# Patient Record
Sex: FEMALE | Race: WHITE | Employment: FULL TIME
[De-identification: names, ages, dates, MRNs, and addresses within clinical notes are randomized per-mention and may not be internally consistent; named-entity substitution may affect disease eponyms.]

---

## 2020-09-09 ENCOUNTER — TRANSCRIBE ORDERS (OUTPATIENT)
Dept: OTHER | Age: 40
End: 2020-09-09

## 2020-09-09 DIAGNOSIS — Z80.3 FAMILY HISTORY OF MALIGNANT NEOPLASM OF BREAST: Primary | ICD-10-CM

## 2020-09-09 DIAGNOSIS — Z80.41 FAMILY HISTORY OF MALIGNANT NEOPLASM OF OVARY IN FIRST DEGREE RELATIVE: ICD-10-CM

## 2020-09-16 NOTE — TELEPHONE ENCOUNTER
ONCOLOGY INTAKE: Records Information      APPT INFORMATION: 10/12/20 - Krysta - Video  Referring provider:  Laurie Mcintosh NP  Referring provider s clinic:  [facility]  Reason for visit/diagnosis:  fx hx breast & ovarian cancers  Has patient been notified of appointment date and time?: Yes    RECORDS INFORMATION:  Were the records received with the referral (via [employer])? No    Has patient been seen for any external appt for this diagnosis? No    ADDITIONAL INFORMATION:  Scheduled via call from li/LUCHO GAYLE  Video visit confirmed for 10/12/20 at 12pm with Kelli Chaparro

## 2020-10-12 ENCOUNTER — PRE VISIT (OUTPATIENT)
Dept: ONCOLOGY | Facility: CLINIC | Age: 40
End: 2020-10-12

## 2020-10-12 ENCOUNTER — VIRTUAL VISIT (OUTPATIENT)
Dept: ONCOLOGY | Facility: CLINIC | Age: 40
End: 2020-10-12
Attending: GENETIC COUNSELOR, MS

## 2020-10-12 DIAGNOSIS — Z80.3 FAMILY HISTORY OF MALIGNANT NEOPLASM OF BREAST: ICD-10-CM

## 2020-10-12 DIAGNOSIS — Z80.41 FAMILY HISTORY OF MALIGNANT NEOPLASM OF OVARY: Primary | ICD-10-CM

## 2020-10-12 PROCEDURE — 99207 PR NO CHARGE LOS: CPT | Performed by: GENETIC COUNSELOR, MS

## 2020-10-12 PROCEDURE — 96040 HC GENETIC COUNSELING, EACH 30 MINUTES: CPT | Mod: GT | Performed by: GENETIC COUNSELOR, MS

## 2020-10-12 NOTE — PROGRESS NOTES
"10/12/2020    Angie Muñoz is a 40 year old female who is being evaluated via a billable video visit.      The patient has been notified of following:     \"This video visit will be conducted via a call between you and your physician/provider. We have found that certain health care needs can be provided without the need for an in-person physical exam.  This service lets us provide the care you need with a video conversation.  If a prescription is necessary we can send it directly to your pharmacy.  If lab work is needed we can place an order for that and you can then stop by our lab to have the test done at a later time.    Video visits are billed at different rates depending on your insurance coverage.  Please reach out to your insurance provider with any questions.    If during the course of the call the physician/provider feels a video visit is not appropriate, you will not be charged for this service.\"    Patient has given verbal consent for Video visit? Yes  How would you like to obtain your AVS? MyChart      Video-Visit Details    Type of service:  Video Visit    Video Start Time: 11:55AM  Video End Time: 12:15PM    Originating Location (pt. Location): Home    Distant Location (provider location):  [facility]     Platform used for Video Visit: Osei Chaparro MS, [facility]    Referring Provider: Laurie Mcintosh NP ([facility])    Presenting Information:   I met with Angie Muñoz today for genetic counseling at the Cancer Risk Management Program (virtual visit completed due to the ongoing COVID-19 pandemic) to discuss her family history of ovarian and breast cancer.  She is here today to review this history, cancer screening recommendations, and available genetic testing options.    Personal History:  Angie is a 40 year old female. She does not have any personal history of cancer.  She had her first menstrual period at age 12-13 and has two children (ages 7 and 11).  She has " her ovaries, fallopian tubes and uterus in place, and has recently had baseline CA-125 and transvaginal ultrasound screening for ovarian cancer.  She reports a mammogram approximately one year ago which was normal.     Family History: (Please see scanned pedigree for detailed family history information)    Her mother was recently diagnosed with ovarian cancer at age 60.  She is meeting with a genetic counselor in [place] tomorrow for genetic testing.      Two maternal aunts were diagnosed with breast cancer, reportedly after the age of 50    Her maternal grandfather passed away in his 70's and had a history of prostate cancer    Limited paternal family history information was available.  Angie reports her paternal grandmother had a history of lung cancer and smoking    Her maternal ethnicity is Latvian. Her paternal ethnicity is Latvian/Ugandan.  There is no known Ashkenazi Muslim ancestry on either side of her family.     Discussion:    Angie's maternal family history of ovarian and breast cancer is suggestive of a possible hereditary cancer syndrome.    We reviewed the features of sporadic, familial, and hereditary cancers. Based on her family history, Angie meets current National Comprehensive Cancer Network (NCCN) criteria for genetic testing of high-penetrance breast and/or ovarian cancer susceptibility genes.      We discussed the natural history and genetics of Hereditary Breast and Ovarian Cancer syndrome, caused by mutations in the BRCA1/2 genes. We discussed that there are additional genes that could cause increased risk for ovarian and/or breast cancer. As many of these genes present with overlapping features in a family and accurate cancer risk cannot always be established based upon the pedigree analysis alone, it would be reasonable for Angie to consider panel genetic testing to analyze multiple genes at once.    A detailed handout regarding hereditary breast and gynecologic cancer, and the  information we discussed can be found in the after visit summary. Topics included: inheritance pattern, cancer risks, cancer screening recommendations, and also risks, benefits and limitations of testing.    We discussed that genetic testing for ovarian cancer susceptibility genes is typically most informative when it is first performed on a family member with a personal history of this cancer. In this situation, we discussed that Angie's mother would be the best person to test first given her maternal family history of ovarian and breast cancer. Testing is available to Angie, but with limitations. If Angie pursues testing at this time and receives a negative result, this does not rule out the possibility of a hereditary cancer syndrome in her and/or her family. Her mother's genetic test results will determine what testing, if any, is recommended for Angie and her siblings.  As her mother is planning to proceed with genetic testing tomorrow, Angie elected to wait until these results are available prior to completing her own test. She will contact me to revisit available genetic testing and medical management recommendations once her mother has completed genetic testing.       We reviewed screening recommendations for Angie based on her family history of cancer.  Of note, these recommendations may change based on the results of her mother's genetic testing:    Angie and her sister may be at increased risk for ovarian cancer given their mother's recent diagnosis. We discussed available ovarian cancer screening (pelvic exams, CA-125 blood tests, and transvaginal ultrasounds) as well as the significant limitations of this screening. As such, this screening is not typically recommended. That being said, women in this family should discuss this screening and the signs and symptoms of ovarian cancer with their primary OB/GYN provider, as they may have individualized recommendations.    Angie  and her close female relatives remain at increased risk for breast cancer given their family history. Breast cancer screening is generally recommended to begin approximately 10 years younger than the earliest age of breast cancer diagnosis in the family, or at age 40, whichever comes first. Breast screening options should be discussed with an individual's primary care provider and a genetic counselor, to determine at what age to begin screening, what screening is appropriate, and if additional screening (such as breast MRI) is necessary based on personal/family history factors.      Cancer screening recommendations for Angie and her close relatives should be revisited when genetic testing has been completed.      Plan:  1) No genetic testing was completed today  2) We reviewed screening recommendations based on her family history of cancer.  Her mother is meeting with a genetic counselor tomorrow and plans to proceed with testing due to her personal and family history of cancer  3) Angie plans to contact me once her mother's genetic testing is completed to revisit testing and management recommendations.      Time spent: 20 minutes    Kelli Chaparro MS, [facility]  Licensed, Certified Genetic Counselor  [facility]  Phone: [phone]

## 2020-10-12 NOTE — LETTER
"    10/12/2020         RE: Angie Muñoz  [address]  [place] [address]        Dear Colleague,    Thank you for referring your patient, Angie Muñoz, to the [facility]. Please see a copy of my visit note below.    10/12/2020    Angie Muñoz is a 40 year old female who is being evaluated via a billable video visit.      The patient has been notified of following:     \"This video visit will be conducted via a call between you and your physician/provider. We have found that certain health care needs can be provided without the need for an in-person physical exam.  This service lets us provide the care you need with a video conversation.  If a prescription is necessary we can send it directly to your pharmacy.  If lab work is needed we can place an order for that and you can then stop by our lab to have the test done at a later time.    Video visits are billed at different rates depending on your insurance coverage.  Please reach out to your insurance provider with any questions.    If during the course of the call the physician/provider feels a video visit is not appropriate, you will not be charged for this service.\"    Patient has given verbal consent for Video visit? Yes  How would you like to obtain your AVS? MyChart      Video-Visit Details    Type of service:  Video Visit    Video Start Time: 11:55AM  Video End Time: 12:15PM    Originating Location (pt. Location): Home    Distant Location (provider location):  [facility]     Platform used for Video Visit: Osei Chaparro MS, [facility]    Referring Provider: Laruie Mcintosh NP ([facility])    Presenting Information:   I met with Angie Muñoz today for genetic counseling at the Cancer Risk Management Program (virtual visit completed due to the ongoing COVID-19 pandemic) to discuss her family history of ovarian and breast cancer.  She is here today to review this history, cancer " screening recommendations, and available genetic testing options.    Personal History:  Angie is a 40 year old female. She does not have any personal history of cancer.  She had her first menstrual period at age 12-13 and has two children (ages 7 and 11).  She has her ovaries, fallopian tubes and uterus in place, and has recently had baseline CA-125 and transvaginal ultrasound screening for ovarian cancer.  She reports a mammogram approximately one year ago which was normal.     Family History: (Please see scanned pedigree for detailed family history information)    Her mother was recently diagnosed with ovarian cancer at age 60.  She is meeting with a genetic counselor in [place] tomorrow for genetic testing.      Two maternal aunts were diagnosed with breast cancer, reportedly after the age of 50    Her maternal grandfather passed away in his 70's and had a history of prostate cancer    Limited paternal family history information was available.  Angie reports her paternal grandmother had a history of lung cancer and smoking    Her maternal ethnicity is [place]. Her paternal ethnicity is [place].  There is no known Ashkenazi Orthodoxy ancestry on either side of her family.     Discussion:    Angie's maternal family history of ovarian and breast cancer is suggestive of a possible hereditary cancer syndrome.    We reviewed the features of sporadic, familial, and hereditary cancers. Based on her family history, Angie meets current National Comprehensive Cancer Network (NCCN) criteria for genetic testing of high-penetrance breast and/or ovarian cancer susceptibility genes.      We discussed the natural history and genetics of Hereditary Breast and Ovarian Cancer syndrome, caused by mutations in the BRCA1/2 genes. We discussed that there are additional genes that could cause increased risk for ovarian and/or breast cancer. As many of these genes present with overlapping features in a family and accurate  cancer risk cannot always be established based upon the pedigree analysis alone, it would be reasonable for Angie to consider panel genetic testing to analyze multiple genes at once.    A detailed handout regarding hereditary breast and gynecologic cancer, and the information we discussed can be found in the after visit summary. Topics included: inheritance pattern, cancer risks, cancer screening recommendations, and also risks, benefits and limitations of testing.    We discussed that genetic testing for ovarian cancer susceptibility genes is typically most informative when it is first performed on a family member with a personal history of this cancer. In this situation, we discussed that Angie's mother would be the best person to test first given her maternal family history of ovarian and breast cancer. Testing is available to Angie, but with limitations. If Angie pursues testing at this time and receives a negative result, this does not rule out the possibility of a hereditary cancer syndrome in her and/or her family. Her mother's genetic test results will determine what testing, if any, is recommended for Angie and her siblings.  As her mother is planning to proceed with genetic testing tomorrow, Angie elected to wait until these results are available prior to completing her own test. She will contact me to revisit available genetic testing and medical management recommendations once her mother has completed genetic testing.       We reviewed screening recommendations for Angie based on her family history of cancer.  Of note, these recommendations may change based on the results of her mother's genetic testing:    Angie and her sister may be at increased risk for ovarian cancer given their mother's recent diagnosis. We discussed available ovarian cancer screening (pelvic exams, CA-125 blood tests, and transvaginal ultrasounds) as well as the significant limitations of this  screening. As such, this screening is not typically recommended. That being said, women in this family should discuss this screening and the signs and symptoms of ovarian cancer with their primary OB/GYN provider, as they may have individualized recommendations.    Angie and her close female relatives remain at increased risk for breast cancer given their family history. Breast cancer screening is generally recommended to begin approximately 10 years younger than the earliest age of breast cancer diagnosis in the family, or at age 40, whichever comes first. Breast screening options should be discussed with an individual's primary care provider and a genetic counselor, to determine at what age to begin screening, what screening is appropriate, and if additional screening (such as breast MRI) is necessary based on personal/family history factors.      Cancer screening recommendations for Angie and her close relatives should be revisited when genetic testing has been completed.      Plan:  1) No genetic testing was completed today  2) We reviewed screening recommendations based on her family history of cancer.  Her mother is meeting with a genetic counselor tomorrow and plans to proceed with testing due to her personal and family history of cancer  3) Angie plans to contact me once her mother's genetic testing is completed to revisit testing and management recommendations.      Time spent: 20 minutes    Kelli Chaparro MS, [facility]  Licensed, Certified Genetic Counselor  [facility]  Phone: [phone]

## 2020-10-12 NOTE — LETTER
Cancer Risk Management  Program Locations    [facility]  [facility]  Mailing Address  Cancer Risk Management Program  [facility]  [address]  [facility] [address]  [place] [address]    New patient appointments  [phone]  October 14, 2020    Angie Muñoz  [address]  [place] [address]      Dear Angie,    It was a pleasure meeting with you on 10/12/2020. Here is a copy of the progress note from your recent genetic counseling visit to the Cancer Risk Management Program. If you have any additional questions, please feel free to call.    Referring Provider: Laurie Mcintosh NP ([facility])    Presenting Information:   I met with Angie Wanda today for genetic counseling at the Cancer Risk Management Program (virtual visit completed due to the ongoing COVID-19 pandemic) to discuss her family history of ovarian and breast cancer.  She is here today to review this history, cancer screening recommendations, and available genetic testing options.    Personal History:  Angie is a 40 year old female. She does not have any personal history of cancer.  She had her first menstrual period at age 12-13 and has two children (ages 7 and 11).  She has her ovaries, fallopian tubes and uterus in place, and has recently had baseline CA-125 and transvaginal ultrasound screening for ovarian cancer.  She reports a mammogram approximately one year ago which was normal.     Family History: (Please see scanned pedigree for detailed family history information)    Her mother was recently diagnosed with ovarian cancer at age 60.  She is meeting with a genetic counselor in [place] tomorrow for genetic testing.      Two maternal aunts were diagnosed with breast cancer, reportedly after the age of 50    Her maternal grandfather passed away in his  70's and had a history of prostate cancer    Limited paternal family history information was available.  Angie reports her paternal grandmother had a history of lung cancer and smoking    Her maternal ethnicity is Syriac. Her paternal ethnicity is Syriac/Montserratian.  There is no known Ashkenazi Sabianism ancestry on either side of her family.     Discussion:    Angie's maternal family history of ovarian and breast cancer is suggestive of a possible hereditary cancer syndrome.    We reviewed the features of sporadic, familial, and hereditary cancers. Based on her family history, Angie meets current National Comprehensive Cancer Network (NCCN) criteria for genetic testing of high-penetrance breast and/or ovarian cancer susceptibility genes.      We discussed the natural history and genetics of Hereditary Breast and Ovarian Cancer syndrome, caused by mutations in the BRCA1/2 genes. We discussed that there are additional genes that could cause increased risk for ovarian and/or breast cancer. As many of these genes present with overlapping features in a family and accurate cancer risk cannot always be established based upon the pedigree analysis alone, it would be reasonable for Angie to consider panel genetic testing to analyze multiple genes at once.    A detailed handout regarding hereditary breast and gynecologic cancer, and the information we discussed can be found in the after visit summary. Topics included: inheritance pattern, cancer risks, cancer screening recommendations, and also risks, benefits and limitations of testing.    We discussed that genetic testing for ovarian cancer susceptibility genes is typically most informative when it is first performed on a family member with a personal history of this cancer. In this situation, we discussed that Angie's mother would be the best person to test first given her maternal family history of ovarian and breast cancer. Testing is available to Angie  but with limitations. If Angie pursues testing at this time and receives a negative result, this does not rule out the possibility of a hereditary cancer syndrome in her and/or her family. Her mother's genetic test results will determine what testing, if any, is recommended for Angie and her siblings.  As her mother is planning to proceed with genetic testing tomorrow, Angie elected to wait until these results are available prior to completing her own test. She will contact me to revisit available genetic testing and medical management recommendations once her mother has completed genetic testing.       We reviewed screening recommendations for Angie based on her family history of cancer.  Of note, these recommendations may change based on the results of her mother's genetic testing:    Angie and her sister may be at increased risk for ovarian cancer given their mother's recent diagnosis. We discussed available ovarian cancer screening (pelvic exams, CA-125 blood tests, and transvaginal ultrasounds) as well as the significant limitations of this screening. As such, this screening is not typically recommended. That being said, women in this family should discuss this screening and the signs and symptoms of ovarian cancer with their primary OB/GYN provider, as they may have individualized recommendations.    Angie and her close female relatives remain at increased risk for breast cancer given their family history. Breast cancer screening is generally recommended to begin approximately 10 years younger than the earliest age of breast cancer diagnosis in the family, or at age 40, whichever comes first. Breast screening options should be discussed with an individual's primary care provider and a genetic counselor, to determine at what age to begin screening, what screening is appropriate, and if additional screening (such as breast MRI) is necessary based on personal/family history factors.       Cancer screening recommendations for Angie and her close relatives should be revisited when genetic testing has been completed.      Plan:  1) No genetic testing was completed today  2) We reviewed screening recommendations based on her family history of cancer.  Her mother is meeting with a genetic counselor tomorrow and plans to proceed with testing due to her personal and family history of cancer  3) Angie plans to contact me once her mother's genetic testing is completed to revisit testing and management recommendations.      Time spent: 20 minutes    Kelli Chaparro MS, [facility]  Licensed, Certified Genetic Counselor  [facility]  Phone: [phone]

## 2020-10-14 NOTE — PATIENT INSTRUCTIONS
Assessing Cancer Risk  Only about 5-10% of cancers are thought to be due to an inherited cancer susceptibility gene.    These families often have:    Several people with the same or related types of cancer    Cancers diagnosed at a young age (before age 50)    Individuals with more than one primary cancer    Multiple generations of the family affected with cancer    Some people may be candidates for genetic testing of more than one gene.  For these families, genetic testing using a cancer panel may be offered.  These panels will test different genes known to increase the risk for breast, ovarian, uterine, and/or other cancers. All of the genes discussed below have published clinical management guidelines for individuals who are found to carry a mutation. The purpose of this handout is to serve as a brief summary of the genes analyzed by the panels used to inquire about hereditary breast and gynecologic cancer:  SANDRA, BRCA1, BRCA2, BRIP1, CDH1, CHEK2, MLH1, MSH2, MSH6, PMS2, EPCAM, PTEN, PALB2, RAD51C, RAD51D, and TP53.  ______________________________________________________________________________  Hereditary Breast and Ovarian Cancer Syndrome   (BRCA1 and BRCA2)  A single mutation in one of the copies of BRCA1 or BRCA2 increases the risk for breast and ovarian cancer, among others.  The risk for pancreatic cancer and melanoma may also be slightly increased in some families.  The chart below shows the chance that someone with a BRCA mutation would develop cancer in his or her lifetime1,2,3,4.        A person s ethnic background is also important to consider, as individuals of [employer]azi [employer] ancestry have a higher chance of having a BRCA gene mutation.  There are three BRCA mutations that occur more frequently in this population.    Cody Syndrome   (MLH1, MSH2, MSH6, PMS2, and EPCAM)  Currently five genes are known to cause Cody Syndrome: MLH1, MSH2, MSH6, PMS2, and EPCAM.  A single mutation in one of the  Cody Syndrome genes increases the risk for colon, endometrial, ovarian, and stomach cancers.  Other cancers that occur less commonly in Cody Syndrome include urinary tract, skin, and brain cancers.  The chart below shows the chance that a person with Cody syndrome would develop cancer in his or her lifetime5.      *Cancer risk varies depending on Cody syndrome gene found    Cowden Syndrome   (PTEN)  Cowden syndrome is a hereditary condition that increases the risk for breast, thyroid, endometrial, colon, and kidney cancer.  Cowden syndrome is caused by a mutation in the PTEN gene.  A single mutation in one of the copies of PTEN causes Cowden syndrome and increases cancer risk.  The chart below shows the chance that someone with a PTEN mutation would develop cancer in their lifetime6,7.  Other benign features seen in some individuals with Cowden syndrome include benign skin lesions (facial papules, keratoses, lipomas), learning disability, autism, thyroid nodules, colon polyps, and larger head size.      *One recent study found breast cancer risk to be increased to 85%    Li-Fraumeni Syndrome   (TP53)  Li-Fraumeni Syndrome (LFS) is a cancer predisposition syndrome caused by a mutation in the TP53 gene. A single mutation in one of the copies of TP53 increases the risk for multiple cancers. Individuals with LFS are at an increased risk for developing cancer at a young age. The lifetime risk for development of a LFS-associated cancer is 50% by age 30 and 90% by age 60.   Core Cancers: Sarcomas, Breast, Brain, Lung, Leukemias/Lymphomas, Adrenocortical carcinomas  Other Cancers: Gastrointestinal, Thyroid, Skin, Genitourinary    Hereditary Diffuse Gastric Cancer   (CDH1)  Currently, one gene is known to cause hereditary diffuse gastric cancer (HDGC): CDH1.  Individuals with HDGC are at increased risk for diffuse gastric cancer and lobular breast cancer. Of people diagnosed with HDGC, 30-50% have a mutation in the CDH1  gene.  This suggests there are likely other genes that may cause HDGC that have not been identified yet.      Lifetime Cancer Risks    General Population HDGC    Diffuse Gastric  <1% ~80%   Breast 12% 39-52%         Additional Genes  SANDRA  SANDRA is a moderate-risk breast cancer gene. Women who have a mutation in SANDRA can have between a 2-4 fold increased risk for breast cancer compared to the general population8. SANDRA mutations have also been associated with increased risk for pancreatic cancer, however an estimate of this cancer risk is not well understood9. Individuals who inherit two SANDRA mutations have a condition called ataxia-telangiectasia (AT).  This rare autosomal recessive condition affects the nervous system and immune system, and is associated with progressive cerebellar ataxia beginning in childhood.  Individuals with ataxia-telangiectasia often have a weakened immune system and have an increased risk for childhood cancers.    PALB2  Mutations in PALB2 have been shown to increase the risk of breast cancer up to 33-58% in some families; where individuals fall within this risk range is dependent upon family akkozux25. PALB2 mutations have also been associated with increased risk for pancreatic cancer, although this risk has not been quantified yet.  Individuals who inherit two PALB2 mutations--one from their mother and one from their father--have a condition called Fanconi Anemia.  This rare autosomal recessive condition is associated with short stature, developmental delay, bone marrow failure, and increased risk for childhood cancers.    CHEK2   CHEK2 is a moderate-risk breast cancer gene.  Women who have a mutation in CHEK2 have around a 2-fold increased risk for breast cancer compared to the general population, and this risk may be higher depending upon family history.11,12,13 Mutations in CHEK2 have also been shown to increase the risk of a number of other cancers, including colon and prostate, however  these cancer risks are currently not well understood.    BRIP1, RAD51C and RAD51D  Mutations in BRIP1, RAD51C, and RAD51D have been shown to increase the risk of ovarian cancer and possibly female breast cancer as well14,15 .       Lifetime Cancer Risk    General Population BRIP1 RAD51C RAD51D   Ovarian 1-2% ~5-8% ~5-9% ~7-15%           Inheritance  All of the cancer syndromes reviewed above are inherited in an autosomal dominant pattern.  This means that if a parent has a mutation, each of his or her children will have a 50% chance of inheriting that same mutation.  Therefore, each child--male or female--would have a 50% chance of being at increased risk for developing cancer.      Image obtained from Genetics Home Reference, 2013     Mutations in some genes can occur de federico, which means that a person s mutation occurred for the first time in them and was not inherited from a parent.  Now that they have the mutation, however, it can be passed on to future generations.    Genetic Testing  Genetic testing involves a blood test and will look at the genetic information in the SANDRA, BRCA1, BRCA2, BRIP1, CDH1, CHEK2, MLH1, MSH2, MSH6, PMS2, EPCAM, PTEN, PALB2, RAD51C, RAD51D, and TP53 genes for any harmful mutations that are associated with increased cancer risk.  If possible, it is recommended that the person(s) who has had cancer be tested before other family members.  That person will give us the most useful information about whether or not a specific gene is associated with the cancer in the family.    Results  There are three possible results of genetic testing:    Positive--a harmful mutation was identified in one or more of the genes    Negative--no mutation was identified in any of the genes on this panel    Variant of unknown significance--a variation in one of the genes was identified, but it is unclear how this impacts cancer risk in the family    Advantages and Disadvantages   There are advantages and  disadvantages to genetic testing.    Advantages    May clarify your cancer risk    Can help you make medical decisions    May explain the cancers in your family    May give useful information to your family members (if you share your results)    Disadvantages    Possible negative emotional impact of learning about inherited cancer risk    Uncertainty in interpreting a negative test result in some situations    Possible genetic discrimination concerns (see below)    Genetic Information Nondiscrimination Act ([place])  [place] is a federal law that protects individuals from health insurance or employment discrimination based on a genetic test result alone.  Although rare, there are currently no legal discrimination protections in terms of life insurance, long term care, or disability insurances.  Visit the [employer] [place] website to learn more.    Reducing Cancer Risk  All of the genes described above have nationally recognized cancer screening guidelines that would be recommended for individuals who test positive.  In addition to increased cancer screening, surgeries may be offered or recommended to reduce cancer risk.  Recommendations are based upon an individual s genetic test result as well as their personal and family history of cancer.    Questions to Think About Regarding Genetic Testing:    What effect will the test result have on me and my relationship with my family members if I have an inherited gene mutation?  If I don t have a gene mutation?    Should I share my test results, and how will my family react to this news, which may also affect them?    Are my children ready to learn new information that may one day affect their own health?    Hereditary Cancer Resources    FORCE: Facing Our Risk of Cancer Empowered facingourrisk.org   Bright Pink bebrightpink.org   Li-Fraumeni Syndrome Association lfsassociation.org   PTEN World PTENworld.com   No stomach for cancer, Inc.  nostomachforcancer.org   Stomach cancer relief network Scrnet.org   Collaborative Group of the Americas on Inherited Colorectal Cancer (CGA) cgaicc.com    Cancer Care cancercare.org   American Cancer Society (ACS) cancer.org   National Cancer [place] (NCI) cancer.gov     Please call us if you have any questions or concerns.   Cancer Risk Management Program [phone] ([phone])  ? [facility] [place] [place] [phone]  ? [place] Bermudez[place]  [phone]  ? [facility] [place]arro[facility] [place]  [phone]  ? [place] Villanueva[place] [phone]  ? Юлия Priscilla[place], [facility] [phone]  ? [place] Echeverria[place], [facility]  [phone]  ? [place]andy [place], [facility]  [phone]    References  1. Tressa LLOYD, [place] PDP, Cezar S, Lazaro CAI, Kenrick JE, Michael JL, Declan N, Velia H, Eliud O, Keren A, Jaceini B, Radilia P, Manmayrakicarin S, Rufus DM, Sethi N, Sam E, Nathan H, Betito E, Biju J, Gronherlinda J, Marlon B, Lizus H, Thorlacius S, Eerola H, Nevcarinlinna H, James K, Gordon OP. Average risks of breast and ovarian cancer associated with BRCA1 or BRCA2 mutations detected in case series unselected for family history: a combined analysis of 222 studies. Am J Hum Kandice. 2003;72:1117-30.  2. Rosanna N, Kaur M, Villagran G.  BRCA1 and BRCA2 Hereditary Breast and Ovarian Cancer. Gene Reviews online. 2013.  3. Harinder YC, Lai S, Aurora G, Boone S. Breast cancer risk among male BRCA1 and BRCA2 mutation carriers. J Natl Cancer Inst. 2007;99:1811-4.  4. Richie JOHNSON, Blu I, Alvaro J, Joce E, Maritza ER, Young F. Risk of breast cancer in male BRCA2 carriers. J Med Kandice. 2010;47:710-1.  5. National Comprehensive Cancer Network. Clinical practice guidelines in oncology, colorectal cancer screening. Available online (registration required). 2015.  6. Terry GRAVES, Gini J, Elissa J, Tenzin LA, Trisha ESCOBEDO, Gina C. Lifetime cancer risks in individuals with germline PTEN mutations. Clin Cancer Res. 2012;18:400-7.  7. Pilarski R. Cowden  Syndrome: A Critical Review of the Clinical Literature. J Kandice . 2009:18:13-27.  8. Korina A, Fabian D, Sirisha S, Tatiana P, Rafael T, Larisa M, Mat B, Jesse H, Brian R, Fabrizio K, Myla L, Richie DG, Rufus D, Joo DF, Rudy MR, The Breast Cancer Susceptibility Collaboration ([place]) & Jorgito ABDUL. SANDRA mutations that cause ataxia-telangiectasia are breast cancer susceptibility alleles. Nature Genetics. 2006;38:873-875  9. Matias N , Amy Y, Bertha J, Ad L, Maureen GM , Swapnil ML, Gallinger S, Morel AG, Syngal S, Yvonne ML, Erasmo J , Cintia R, Polo SZ, Jaron JR, Dayana VE, Jose Manuel M, Vojac B, Noemi N, Jones RH, Ridge KW, and Zion AP. SANDRA mutations in patients with hereditary pancreatic cancer. Cancer Discover. 2012;2:41-46  10. Tressa WHITE, et al. Breast-Cancer Risk in Families with Mutations in PALB2. NEJM. 2014; 371(6):497-506.  11. CHEK2 Breast Cancer Case-Control Consortium. CHEK2*1100delC and susceptibility to breast cancer: A collaborative analysis involving 10,860 breast cancer cases and 9,065 controls from 10 studies. Am J Hum Kandice, 74 (2004), pp. 1026-2116  12. Ana T, Chava S, Ophelia K, et al. Spectrum of Mutations in BRCA1, BRCA2, CHEK2, and TP53 in Families at High Risk of Breast Cancer. IVONNE. 2006;295(12):6323-0184.   13. Elise C, Pierce D, Deysi A, et al. Risk of breast cancer in women with a CHEK2 mutation with and without a family history of breast cancer. J Clin Oncol. 2011;29:3471-1820.  14. Ken H, Jazmin E, Osvaldo SJ, et al. Contribution of germline mutations in the RAD51B, RAD51C, and RAD51D genes to ovarian cancer in the population. J Clin Oncol. 2015;33(26):3390-6194. Doi:10.1200/JCO.2015.61.2408.  15. Nanci T, Gersno PADILLA, Dannie P, et al. Mutations in BRIP1 confer high risk of ovarian cancer. Eleanor Kandice. 2011;43(11):7228-0993. doi:10.1038/ng.955.

## 2020-11-22 ENCOUNTER — HEALTH MAINTENANCE LETTER (OUTPATIENT)
Age: 40
End: 2020-11-22

## 2020-12-15 ENCOUNTER — APPOINTMENT (OUTPATIENT)
Dept: URBAN - METROPOLITAN AREA CLINIC 259 | Age: 40
Setting detail: DERMATOLOGY
End: 2020-12-15

## 2020-12-15 DIAGNOSIS — Z71.89 OTHER SPECIFIED COUNSELING: ICD-10-CM

## 2020-12-15 DIAGNOSIS — D22 MELANOCYTIC NEVI: ICD-10-CM

## 2020-12-15 DIAGNOSIS — L81.4 OTHER MELANIN HYPERPIGMENTATION: ICD-10-CM

## 2020-12-15 DIAGNOSIS — L57.8 OTHER SKIN CHANGES DUE TO CHRONIC EXPOSURE TO NONIONIZING RADIATION: ICD-10-CM

## 2020-12-15 DIAGNOSIS — L71.8 OTHER ROSACEA: ICD-10-CM

## 2020-12-15 DIAGNOSIS — D18.0 HEMANGIOMA: ICD-10-CM

## 2020-12-15 PROBLEM — D18.01 HEMANGIOMA OF SKIN AND SUBCUTANEOUS TISSUE: Status: ACTIVE | Noted: 2020-12-15

## 2020-12-15 PROBLEM — D22.5 MELANOCYTIC NEVI OF TRUNK: Status: ACTIVE | Noted: 2020-12-15

## 2020-12-15 PROCEDURE — 99214 OFFICE O/P EST MOD 30 MIN: CPT

## 2020-12-15 PROCEDURE — OTHER PRESCRIPTION: OTHER

## 2020-12-15 PROCEDURE — OTHER COUNSELING: OTHER

## 2020-12-15 ASSESSMENT — LOCATION ZONE DERM
LOCATION ZONE: NOSE
LOCATION ZONE: TRUNK

## 2020-12-15 ASSESSMENT — LOCATION SIMPLE DESCRIPTION DERM
LOCATION SIMPLE: LEFT UPPER BACK
LOCATION SIMPLE: NOSE
LOCATION SIMPLE: UPPER BACK

## 2020-12-15 ASSESSMENT — LOCATION DETAILED DESCRIPTION DERM
LOCATION DETAILED: INFERIOR THORACIC SPINE
LOCATION DETAILED: LEFT MEDIAL UPPER BACK
LOCATION DETAILED: LEFT INFERIOR MEDIAL UPPER BACK
LOCATION DETAILED: NASAL DORSUM

## 2020-12-15 NOTE — PROCEDURE: COUNSELING
Patient Specific Counseling (Will Not Stick From Patient To Patient): \\nDiscussed Vbeam laser treatment, [place] [facility] Patient Specific Counseling (Will Not Stick From Patient To Patient): \\nDiscussed Vbeam laser treatment, patient [place] [facility] [place].

## 2020-12-15 NOTE — HPI: FULL BODY SKIN EXAMINATION
How Severe Are Your Spot(S)?: mild
What Type Of Note Output Would You Prefer (Optional)?: Standard Output
What Is The Reason For Today's Visit?: Full Body Skin Examination
What Is The Reason For Today's Visit? (Being Monitored For X): concerning skin lesions on an annual basis
Additional History: Patient presents for full body exam, stated rosacea has flared and would like treatment for it.

## 2021-09-19 ENCOUNTER — HEALTH MAINTENANCE LETTER (OUTPATIENT)
Age: 41
End: 2021-09-19

## 2022-04-21 ENCOUNTER — APPOINTMENT (OUTPATIENT)
Dept: URBAN - METROPOLITAN AREA CLINIC 259 | Age: 42
Setting detail: DERMATOLOGY
End: 2022-04-21

## 2022-04-21 DIAGNOSIS — D18.0 HEMANGIOMA: ICD-10-CM

## 2022-04-21 DIAGNOSIS — L81.4 OTHER MELANIN HYPERPIGMENTATION: ICD-10-CM

## 2022-04-21 DIAGNOSIS — L71.8 OTHER ROSACEA: ICD-10-CM

## 2022-04-21 DIAGNOSIS — D22 MELANOCYTIC NEVI: ICD-10-CM

## 2022-04-21 DIAGNOSIS — Z71.89 OTHER SPECIFIED COUNSELING: ICD-10-CM

## 2022-04-21 DIAGNOSIS — L57.8 OTHER SKIN CHANGES DUE TO CHRONIC EXPOSURE TO NONIONIZING RADIATION: ICD-10-CM

## 2022-04-21 PROBLEM — D18.01 HEMANGIOMA OF SKIN AND SUBCUTANEOUS TISSUE: Status: ACTIVE | Noted: 2022-04-21

## 2022-04-21 PROBLEM — D22.5 MELANOCYTIC NEVI OF TRUNK: Status: ACTIVE | Noted: 2022-04-21

## 2022-04-21 PROCEDURE — OTHER COUNSELING: OTHER

## 2022-04-21 PROCEDURE — OTHER MIPS QUALITY: OTHER

## 2022-04-21 PROCEDURE — 99213 OFFICE O/P EST LOW 20 MIN: CPT

## 2022-04-21 ASSESSMENT — LOCATION SIMPLE DESCRIPTION DERM
LOCATION SIMPLE: LEFT CHEEK
LOCATION SIMPLE: LEFT UPPER BACK
LOCATION SIMPLE: LOWER BACK
LOCATION SIMPLE: UPPER BACK

## 2022-04-21 ASSESSMENT — LOCATION DETAILED DESCRIPTION DERM
LOCATION DETAILED: LEFT INFERIOR CENTRAL MALAR CHEEK
LOCATION DETAILED: SUPERIOR LUMBAR SPINE
LOCATION DETAILED: LEFT INFERIOR MEDIAL UPPER BACK
LOCATION DETAILED: LEFT MEDIAL UPPER BACK
LOCATION DETAILED: INFERIOR THORACIC SPINE

## 2022-04-21 ASSESSMENT — LOCATION ZONE DERM
LOCATION ZONE: FACE
LOCATION ZONE: TRUNK

## 2022-04-21 NOTE — PROCEDURE: MIPS QUALITY
Quality 130: Documentation Of Current Medications In The Medical Record: Current Medications Documented
Quality 110: Preventive Care And Screening: Influenza Immunization: Influenza Immunization Ordered or Recommended, but not Administered due to system reason
Quality 226: Preventive Care And Screening: Tobacco Use: Screening And Cessation Intervention: Patient screened for tobacco use and is an ex/non-smoker
Detail Level: Generalized
Quality 431: Preventive Care And Screening: Unhealthy Alcohol Use - Screening: Patient not identified as an unhealthy alcohol user when screened for unhealthy alcohol use using a systematic screening method

## 2022-04-21 NOTE — PROCEDURE: COUNSELING
Patient Specific Counseling (Will Not Stick From Patient To Patient): Pt was recommended Vbeam laser, patient was referred to the [address] for treatment. Patient Specific Counseling (Will Not Stick From Patient To Patient): Pt was recommended Vbeam laser, patient was referred to the [address] for treatment.

## 2022-06-26 ENCOUNTER — HEALTH MAINTENANCE LETTER (OUTPATIENT)
Age: 42
End: 2022-06-26

## 2022-11-21 ENCOUNTER — HEALTH MAINTENANCE LETTER (OUTPATIENT)
Age: 42
End: 2022-11-21

## 2023-07-08 ENCOUNTER — HEALTH MAINTENANCE LETTER (OUTPATIENT)
Age: 43
End: 2023-07-08

## 2025-03-04 ENCOUNTER — APPOINTMENT (OUTPATIENT)
Dept: URBAN - METROPOLITAN AREA CLINIC 259 | Age: 45
Setting detail: DERMATOLOGY
End: 2025-03-04

## 2025-03-04 DIAGNOSIS — L73.8 OTHER SPECIFIED FOLLICULAR DISORDERS: ICD-10-CM

## 2025-03-04 DIAGNOSIS — D22 MELANOCYTIC NEVI: ICD-10-CM

## 2025-03-04 DIAGNOSIS — L81.4 OTHER MELANIN HYPERPIGMENTATION: ICD-10-CM

## 2025-03-04 DIAGNOSIS — D18.0 HEMANGIOMA: ICD-10-CM

## 2025-03-04 DIAGNOSIS — L57.8 OTHER SKIN CHANGES DUE TO CHRONIC EXPOSURE TO NONIONIZING RADIATION: ICD-10-CM

## 2025-03-04 DIAGNOSIS — Z71.89 OTHER SPECIFIED COUNSELING: ICD-10-CM

## 2025-03-04 PROBLEM — D18.01 HEMANGIOMA OF SKIN AND SUBCUTANEOUS TISSUE: Status: ACTIVE | Noted: 2025-03-04

## 2025-03-04 PROBLEM — D22.5 MELANOCYTIC NEVI OF TRUNK: Status: ACTIVE | Noted: 2025-03-04

## 2025-03-04 PROCEDURE — OTHER MIPS QUALITY: OTHER

## 2025-03-04 PROCEDURE — 99213 OFFICE O/P EST LOW 20 MIN: CPT

## 2025-03-04 PROCEDURE — OTHER COUNSELING: OTHER

## 2025-03-04 ASSESSMENT — LOCATION DETAILED DESCRIPTION DERM
LOCATION DETAILED: RIGHT LATERAL BUCCAL CHEEK
LOCATION DETAILED: LEFT MEDIAL UPPER BACK
LOCATION DETAILED: RIGHT LOWER CUTANEOUS LIP
LOCATION DETAILED: LEFT SUPERIOR MEDIAL UPPER BACK
LOCATION DETAILED: GLABELLA

## 2025-03-04 ASSESSMENT — LOCATION SIMPLE DESCRIPTION DERM
LOCATION SIMPLE: LEFT UPPER BACK
LOCATION SIMPLE: GLABELLA
LOCATION SIMPLE: RIGHT CHEEK
LOCATION SIMPLE: RIGHT LIP

## 2025-03-04 ASSESSMENT — LOCATION ZONE DERM
LOCATION ZONE: TRUNK
LOCATION ZONE: FACE
LOCATION ZONE: LIP

## 2025-03-04 NOTE — HPI: FULL BODY SKIN EXAMINATION
How Severe Are Your Spot(S)?: mild
What Type Of Note Output Would You Prefer (Optional)?: Standard Output
What Is The Reason For Today's Visit?: Full Body Skin Examination
What Is The Reason For Today's Visit? (Being Monitored For X): concerning skin lesions on an annual basis
Additional History: Pt denies any specific lesions of concern and is seeking further evaluation of her current skin lesion at this time.

## 2025-03-04 NOTE — PROCEDURE: COUNSELING
Detail Level: Generalized
Detail Level: Detailed
Patient Specific Counseling (Will Not Stick From Patient To Patient): ****\\nPlan: Electrodessication\\nThe patient's consent was obtained including but not limited to risks of crusting, scabbing, blistering, scarring, darker or lighter pigmentary change, recurrence, incomplete removal and infection.A total of 3 lesions were treated with light electrodesiccation located on the nose, left cheek, and chin using 2 jacobsen. This procedure was medically necessary because the lesions that were treated were: enlarging, inflamed, irritated, and itchy.